# Patient Record
Sex: MALE | Race: WHITE
[De-identification: names, ages, dates, MRNs, and addresses within clinical notes are randomized per-mention and may not be internally consistent; named-entity substitution may affect disease eponyms.]

---

## 2020-06-30 ENCOUNTER — HOSPITAL ENCOUNTER (OUTPATIENT)
Dept: HOSPITAL 47 - ORWHC2ENDO | Age: 60
Discharge: HOME | End: 2020-06-30
Attending: INTERNAL MEDICINE
Payer: COMMERCIAL

## 2020-06-30 VITALS — HEART RATE: 75 BPM | SYSTOLIC BLOOD PRESSURE: 119 MMHG | DIASTOLIC BLOOD PRESSURE: 82 MMHG

## 2020-06-30 VITALS — TEMPERATURE: 96.7 F | RESPIRATION RATE: 16 BRPM

## 2020-06-30 VITALS — BODY MASS INDEX: 20 KG/M2

## 2020-06-30 DIAGNOSIS — K31.7: ICD-10-CM

## 2020-06-30 DIAGNOSIS — Z79.82: ICD-10-CM

## 2020-06-30 DIAGNOSIS — Z88.2: ICD-10-CM

## 2020-06-30 DIAGNOSIS — K29.50: ICD-10-CM

## 2020-06-30 DIAGNOSIS — Z98.890: ICD-10-CM

## 2020-06-30 DIAGNOSIS — K21.0: Primary | ICD-10-CM

## 2020-06-30 DIAGNOSIS — Z79.899: ICD-10-CM

## 2020-06-30 DIAGNOSIS — Z87.438: ICD-10-CM

## 2020-06-30 PROCEDURE — 43239 EGD BIOPSY SINGLE/MULTIPLE: CPT

## 2020-06-30 PROCEDURE — 88305 TISSUE EXAM BY PATHOLOGIST: CPT

## 2020-06-30 NOTE — P.PCN
Date of Procedure: 06/30/20


Description of Procedure: 





BRIEF HISTORY: 


Patient is a 60-year-old male presenting for outpatient 

esophagogastroduodenoscopy for evaluation of GERD with esophagitis.  Patient 

reports symptoms of abdominal pain, gas, bloating especially after fatty meals. 

Was started on PPI therapy which he did not feel helped with symptoms.





PROCEDURE PERFORMED: 


Esophagogastroduodenoscopy with biopsy.





PREOPERATIVE DIAGNOSIS: 


GERD with esophagitis. 





ESTIMATED BLOOD LOSS: 


Minimal.





IV sedation per anesthesia. 





PROCEDURE: 


After informed consent was obtained, the patient  was brought into the endoscopy

unit. IV sedation was administered by Anesthesia under continuous monitoring. 

Initially the Olympus GIF-190 video endoscope was inserted into the mouth. 

Esophagus intubated without any difficulty. It was gradually advanced into the 

stomach and duodenum and carefully examined. The bulb and the second part of the

duodenum appeared normal, with biopsies taken to rule out celiac sprue. The 

scope at this time was withdrawn to the stomach, adequately insufflated with 

air, and upon careful examination, mucosa of the antrum, body, cardia and the 

fundus appeared normal, except for some mild punctate erythema in the antrum and

body suggestive of mild gastritis with biopsies taken. The scope was then 

withdrawn into the esophagus. The GE junction was located at 42 cm from the 

incisors and appeared normal with biopsies taken to rule out reflux esophagitis.

The esophagus appeared normal. There were no erosions or ulcerations seen and 

the patient tolerated the procedure well. 





IMPRESSION: 


1.  Mild gastritis antrum and body, biopsied.


2.  Biopsies of the duodenum and GE junction.


3.  No other findings to explain symptoms of abdominal pain, gas, bloating, 

distention.





RECOMMENDATIONS: 


The findings of this examination were discussed with the patient and his wife.  

Okay to resume diet.  Okay to resume medications.  Await pathology from 

biopsies.  Follow up with primary care physician as previously scheduled.